# Patient Record
Sex: MALE | Race: WHITE
[De-identification: names, ages, dates, MRNs, and addresses within clinical notes are randomized per-mention and may not be internally consistent; named-entity substitution may affect disease eponyms.]

---

## 2018-09-14 ENCOUNTER — HOSPITAL ENCOUNTER (OUTPATIENT)
Dept: HOSPITAL 74 - FASU | Age: 48
Discharge: HOME | End: 2018-09-14
Attending: ORTHOPAEDIC SURGERY
Payer: COMMERCIAL

## 2018-09-14 VITALS — HEART RATE: 64 BPM | DIASTOLIC BLOOD PRESSURE: 73 MMHG | SYSTOLIC BLOOD PRESSURE: 107 MMHG | TEMPERATURE: 98 F

## 2018-09-14 VITALS — BODY MASS INDEX: 32.5 KG/M2

## 2018-09-14 DIAGNOSIS — S42.022A: Primary | ICD-10-CM
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
## 2018-09-14 PROCEDURE — 0PSB04Z REPOSITION LEFT CLAVICLE WITH INTERNAL FIXATION DEVICE, OPEN APPROACH: ICD-10-PCS | Performed by: ORTHOPAEDIC SURGERY

## 2018-09-14 NOTE — OP
DATE OF OPERATION:  09/14/2018

 

SITE:  [facility]. 

 

SURGEON:  Tamanna Singh MD

 

ASSISSTANT:  MAGDALENA Gibbons

 

PREOPERATIVE DIAGNOSIS:  Left midshaft clavicle fracture, displaced.  

 

POSTOPERATIVE DIAGNOSIS:  Left midshaft clavicle fracture, displaced.  

 

PROCEDURE:  Open reduction, internal fixation of left clavicle fracture.

 

FINDINGS:  Midshaft clavicle fracture with butterfly fragment, spiral fracture.  

 

REPAIR TYPE:  A 6-hole Acumed Plate was placed across the fracture site with filling

the 1st, 2nd, 5th, and 6th screws.  The interfrag screws were treated with FiberWire,

bringing the butterfly fragment into compression.  

 

DESCRIPTION OF PROCEDURE:  Informed consent was obtained.  The patient was taken to

the operating room where the left upper extremity was prepped and draped in a sterile

fashion up to the neck.  C-arm fluoroscopy was used to center the area of the

fracture.  

 

Incision was made approximately 10 cm in length over the area of the fracture. 

Trapezius was incised, and the medial and lateral fragments were identified.  The

butterfly fragment was also found.  A reduction clamp was placed, and a 6-hole Acumed

plate was initially placed on the medial side and then clamped to the lateral side. 

This showed a good reduction of the fracture.  Screws using AO technique, screws were

placed in the 1st, 2nd, 5th, and 6th screws going through bicortical fixation.  The

butterfly fragment remained attached to its soft tissue component and the drill hole

was placed through the center and tied in using No. 2 FiberWire into the central

portion of the clavicle.  

 

Throughout, this wound was irrigated with copious amounts of irrigation.  A 0 Vicryl

running trapezial fascial, so this was placed over the area, followed by 2-0 Vicryl

and 3-0 running Prolene.  Sterile dressing was placed.  The patient was transferred

to recovery without complication.  Patient was placed in a sling.  

 

 

TAMANNA SINGH M.D.

 

[id number]

DD: 09/14/2018 11:17

DT: 09/14/2018 14:07

Job #:  [id number]